# Patient Record
Sex: MALE | Race: WHITE | HISPANIC OR LATINO | Employment: FULL TIME | URBAN - METROPOLITAN AREA
[De-identification: names, ages, dates, MRNs, and addresses within clinical notes are randomized per-mention and may not be internally consistent; named-entity substitution may affect disease eponyms.]

---

## 2017-03-19 ENCOUNTER — HOSPITAL ENCOUNTER (OUTPATIENT)
Facility: MEDICAL CENTER | Age: 37
End: 2017-03-19
Attending: NURSE PRACTITIONER
Payer: COMMERCIAL

## 2017-03-19 ENCOUNTER — OFFICE VISIT (OUTPATIENT)
Dept: URGENT CARE | Facility: CLINIC | Age: 37
End: 2017-03-19
Payer: COMMERCIAL

## 2017-03-19 VITALS
SYSTOLIC BLOOD PRESSURE: 128 MMHG | HEIGHT: 70 IN | TEMPERATURE: 98.5 F | DIASTOLIC BLOOD PRESSURE: 74 MMHG | BODY MASS INDEX: 21.47 KG/M2 | OXYGEN SATURATION: 97 % | WEIGHT: 150 LBS | HEART RATE: 82 BPM

## 2017-03-19 DIAGNOSIS — Z20.2 POSSIBLE EXPOSURE TO STD: ICD-10-CM

## 2017-03-19 LAB
APPEARANCE UR: CLEAR
BILIRUB UR STRIP-MCNC: NORMAL MG/DL
COLOR UR AUTO: NORMAL
GLUCOSE UR STRIP.AUTO-MCNC: NORMAL MG/DL
KETONES UR STRIP.AUTO-MCNC: NORMAL MG/DL
LEUKOCYTE ESTERASE UR QL STRIP.AUTO: NORMAL
NITRITE UR QL STRIP.AUTO: NORMAL
PH UR STRIP.AUTO: 7 [PH] (ref 5–8)
PROT UR QL STRIP: NORMAL MG/DL
RBC UR QL AUTO: NORMAL
SP GR UR STRIP.AUTO: 1
UROBILINOGEN UR STRIP-MCNC: 0.2 MG/DL

## 2017-03-19 PROCEDURE — 87591 N.GONORRHOEAE DNA AMP PROB: CPT

## 2017-03-19 PROCEDURE — 87491 CHLMYD TRACH DNA AMP PROBE: CPT

## 2017-03-19 PROCEDURE — 81002 URINALYSIS NONAUTO W/O SCOPE: CPT | Performed by: NURSE PRACTITIONER

## 2017-03-19 PROCEDURE — 99204 OFFICE O/P NEW MOD 45 MIN: CPT | Performed by: NURSE PRACTITIONER

## 2017-03-19 RX ORDER — AZITHROMYCIN 500 MG/1
1000 TABLET, FILM COATED ORAL ONCE
Qty: 2 TAB | Refills: 0 | Status: SHIPPED | OUTPATIENT
Start: 2017-03-19 | End: 2017-03-19

## 2017-03-19 RX ORDER — CEFTRIAXONE SODIUM 250 MG/1
250 INJECTION, POWDER, FOR SOLUTION INTRAMUSCULAR; INTRAVENOUS ONCE
Status: COMPLETED | OUTPATIENT
Start: 2017-03-19 | End: 2017-03-19

## 2017-03-19 RX ADMIN — CEFTRIAXONE SODIUM 250 MG: 250 INJECTION, POWDER, FOR SOLUTION INTRAMUSCULAR; INTRAVENOUS at 17:37

## 2017-03-19 NOTE — MR AVS SNAPSHOT
"        Octavio Burleson   3/19/2017 4:15 PM   Office Visit   MRN: [id number]    Department:  [facility]   Dept Phone:  [phone]    Description:  Male : 1980   Provider:  BEE Valentin           Reason for Visit     Other possible std  , exposed 3--17 .      Allergies as of 3/19/2017     No Known Allergies      You were diagnosed with     Possible exposure to STD   [587489]         Vital Signs     Blood Pressure Pulse Temperature Height Weight Body Mass Index    128/74 mmHg 82 36.9 °C (98.5 °F) 1.778 m (5' 10\") 68.04 kg (150 lb) 21.52 kg/m2    Oxygen Saturation Smoking Status                97% Current Some Day Smoker          Basic Information     Date Of Birth Sex Race Ethnicity Preferred Language    1980 Male Unable to Obtain Unknown English      Health Maintenance        Date Due Completion Dates    IMM DTaP/Tdap/Td Vaccine (2 - Td) 4/15/2026 4/15/2016            Results     POCT Urinalysis      Component Value Standard Range & Units    POC Color straw Negative    POC Appearance clear Negative    POC Leukocyte Esterase neg Negative    POC Nitrites neg Negative    POC Urobiligen 0.2 Negative (0.2) mg/dL    POC Protein sm Negative mg/dL    POC Urine PH 7.0 5.0 - 8.0    POC Blood tr Negative    POC Specific Gravity 1.005 <1.005 - >1.030    POC Ketones neg Negative mg/dL    POC Biliruben neg Negative mg/dL    POC Glucose neg Negative mg/dL                        Current Immunizations     Influenza Vaccine Quad Inj (Pf) 10/24/2016  8:15 AM    Tdap Vaccine 4/15/2016      Below and/or attached are the medications your provider expects you to take. Review all of your home medications and newly ordered medications with your provider and/or pharmacist. Follow medication instructions as directed by your provider and/or pharmacist. Please keep your medication list with you and share with your provider. Update the information when medications are discontinued, doses are changed, or new " medications (including over-the-counter products) are added; and carry medication information at all times in the event of emergency situations     Allergies:  No Known Allergies          Medications  Valid as of: March 19, 2017 -  5:42 PM    Generic Name Brand Name Tablet Size Instructions for use    Azithromycin (Tab) ZITHROMAX 500 MG Take 2 Tabs by mouth Once for 1 dose.        Naproxen (Tab) NAPROSYN 500 MG Take 1 Tab by mouth 2 times a day, with meals.        Sildenafil Citrate (Tab) VIAGRA 50 MG Take 1 Tab by mouth as needed for Erectile Dysfunction.        .                 Medicines prescribed today were sent to:     [facility]  [place] [address]    [address]) [place] [address]    Phone: [phone] Fax: [phone]    Open 24 Hours?: No      Medication refill instructions:       If your prescription bottle indicates you have medication refills left, it is not necessary to call your provider’s office. Please contact your pharmacy and they will refill your medication.    If your prescription bottle indicates you do not have any refills left, you may request refills at any time through one of the following ways: The online [employer] system (except Urgent Care), by calling your provider’s office, or by asking your pharmacy to contact your provider’s office with a refill request. Medication refills are processed only during regular business hours and may not be available until the next business day. Your provider may request additional information or to have a follow-up visit with you prior to refilling your medication.   *Please Note: Medication refills are assigned a new Rx number when refilled electronically. Your pharmacy may indicate that no refills were authorized even though a new prescription for the same medication is available at the pharmacy. Please request the medicine by name with the pharmacy before contacting your provider for a refill.        Your To Do List      Future Labs/Procedures Complete By Expires    CHLAMYDIA/GC PCR URINE OR SWAB  As directed 3/19/2018         Turpitude Access Code: [email or web address]  Expires: 4/18/2017  4:00 PM    Turpitude  A secure, online tool to manage your health information     [employer] Turpitude® is a secure, online tool that connects you to your personalized health information from the privacy of your home -- day or night - making it very easy for you to manage your healthcare. Once the activation process is completed, you can even access your medical information using the Turpitude shobha, which is available for free in the Apple Shobha store or Google Play store.     Turpitude provides the following levels of access (as shown below):   My Chart Features   [facility] Doctor [facility]  Specialists [facility]  Urgent  Care Non-[facility]  Primary Care  Doctor   Email your healthcare team securely and privately 24/7 X X X    Manage appointments: schedule your next appointment; view details of past/upcoming appointments X      Request prescription refills. X      View recent personal medical records, including lab and immunizations X X X X   View health record, including health history, allergies, medications X X X X   Read reports about your outpatient visits, procedures, consult and ER notes X X X X   See your discharge summary, which is a recap of your hospital and/or ER visit that includes your diagnosis, lab results, and care plan. X X       How to register for Turpitude:  1. Go to  [email or web address].  2. Click on the Sign Up Now box, which takes you to the New Member Sign Up page. You will need to provide the following information:  a. Enter your Turpitude Access Code exactly as it appears at the top of this page. (You will not need to use this code after you’ve completed the sign-up process. If you do not sign up before the expiration date, you must request a new code.)   b. Enter your date of birth.   c. Enter your home email address.    d. Click Submit, and follow the next screen’s instructions.  3. Create a R[employer] ID. This will be your R[employer] Networks login ID and cannot be changed, so think of one that is secure and easy to remember.  4. Create a [employer] password. You can change your password at any time.  5. Enter your Password Reset Question and Answer. This can be used at a later time if you forget your password.   6. Enter your e-mail address. This allows you to receive e-mail notifications when new information is available in R[employer].  7. Click Sign Up. You can now view your health information.    For assistance activating your [employer] account, call [phone]        Quit Tobacco Information     Do you want to quit using tobacco?    Quitting tobacco decreases risks of cancer, heart and lung disease, increases life expectancy, improves sense of taste and smell, and increases spending money, among other benefits.    If you are thinking about quitting, we can help.  • [facility] Quit Tobacco Program: [phone]  o Program occurs weekly for four weeks and includes pharmacist consultation on products to support quitting smoking or chewing tobacco. A provider referral is needed for pharmacist consultation.  • Tobacco Users Help Hotline: 3-789-QUITNOW (780-9543) or https://[place].quitlogix.org/  o Free, confidential telephone and online coaching for [place] residents. Sessions are designed on a schedule that is convenient for you. Eligible clients receive free nicotine replacement therapy.  • Nationally: www.smokefree.gov  o Information and professional assistance to support both immediate and long-term needs as you become, and remain, a non-smoker. Smokefree.gov allows you to choose the help that best fits your needs.

## 2017-03-20 DIAGNOSIS — Z20.2 POSSIBLE EXPOSURE TO STD: ICD-10-CM

## 2017-03-20 LAB
C TRACH DNA GENITAL QL NAA+PROBE: NEGATIVE
N GONORRHOEA DNA GENITAL QL NAA+PROBE: NEGATIVE
SPECIMEN SOURCE: NORMAL

## 2017-03-20 ASSESSMENT — ENCOUNTER SYMPTOMS
WEAKNESS: 0
CHILLS: 0
FEVER: 0
BACK PAIN: 0
ABDOMINAL PAIN: 0
FLANK PAIN: 0
MYALGIAS: 0
VOMITING: 0
NAUSEA: 0

## 2017-03-20 NOTE — PROGRESS NOTES
"Subjective:      Octavio Burleson is a 36 y.o. male who presents with Other            Other  Pertinent negatives include no abdominal pain, chills, fever, myalgias, nausea, vomiting or weakness.   Octavio is a 36 year old male who is here for possible STD exposure. States was contacted last night by a woman he had unprotected sexual intercourse on 3/2/17. She stated she was being treated for \"pain\" (unsure of what he states) due to gonorrhea/chlamydia. Denies any dysuria, penis discharge, lesions, blisters, scrotal swelling/redness/pain. Denies fever, n/v, abdominal/back pain.    PMH:  has no past medical history on file.  MEDS:   Current outpatient prescriptions:   •  sildenafil citrate (VIAGRA) 50 MG tablet, Take 1 Tab by mouth as needed for Erectile Dysfunction., Disp: 10 Tab, Rfl: 3  •  naproxen (NAPROSYN) 500 MG Tab, Take 1 Tab by mouth 2 times a day, with meals. (Patient not taking: Reported on 4/15/2016), Disp: 60 Tab, Rfl: 0  ALLERGIES: No Known Allergies  SURGHX:   Past Surgical History   Procedure Laterality Date   • Humerus orif       SOCHX:  reports that he has been smoking Cigarettes and Cigars.  He has never used smokeless tobacco. He reports that he drinks alcohol. He reports that he does not use illicit drugs.  FH: Family history was reviewed, no pertinent findings to report      Review of Systems   Constitutional: Negative for fever, chills and malaise/fatigue.   Gastrointestinal: Negative for nausea, vomiting and abdominal pain.   Genitourinary: Negative for dysuria, urgency, frequency, hematuria and flank pain.   Musculoskeletal: Negative for myalgias and back pain.   Neurological: Negative for weakness.          Objective:     /74 mmHg  Pulse 82  Temp(Src) 36.9 °C (98.5 °F)  Ht 1.778 m (5' 10\")  Wt 68.04 kg (150 lb)  BMI 21.52 kg/m2  SpO2 97%     Physical Exam   Constitutional: He is oriented to person, place, and time. He appears well-developed and well-nourished. No distress.   HENT: "   Head: Normocephalic.   Eyes: Conjunctivae and EOM are normal. Pupils are equal, round, and reactive to light.   Neck: Normal range of motion. Neck supple.   Cardiovascular: Normal rate.    Pulmonary/Chest: Effort normal.   Genitourinary:    exam deferred due to no symptoms   Musculoskeletal: Normal range of motion.   Neurological: He is alert and oriented to person, place, and time.   Skin: Skin is warm and dry. He is not diaphoretic.   Vitals reviewed.    POC Color straw Negative Final      POC Appearance clear Negative Final     POC Leukocyte Esterase neg Negative Final     POC Nitrites neg Negative Final     POC Urobiligen 0.2 Negative (0.2) mg/dL Final     POC Protein sm Negative mg/dL Final     POC Urine PH 7.0 5.0 - 8.0 Final     POC Blood tr Negative Final     POC Specific Gravity 1.005 <1.005 - >1.030 Final     POC Ketones neg Negative mg/dL Final     POC Biliruben neg Negative mg/dL Final     POC Glucose neg Negative mg/dL Final               Assessment/Plan:     1. Possible exposure to STD    - POCT Urinalysis  - CHLAMYDIA/GC PCR URINE OR SWAB; Future  - cefTRIAXone (ROCEPHIN) injection 250 mg; 250 mg by Intramuscular route Once.  - azithromycin (ZITHROMAX) 500 MG tablet; Take 2 Tabs by mouth Once for 1 dose.  Dispense: 2 Tab; Refill: 0    Avoid unprotected sexual intercourse   Monitor for penial discharge, pain with urination, blood or lower abdominal or back pain-  Need re-evaluation

## 2017-03-22 ENCOUNTER — TELEPHONE (OUTPATIENT)
Dept: URGENT CARE | Facility: CLINIC | Age: 37
End: 2017-03-22

## 2017-03-23 ENCOUNTER — OFFICE VISIT (OUTPATIENT)
Dept: URGENT CARE | Facility: CLINIC | Age: 37
End: 2017-03-23
Payer: COMMERCIAL

## 2017-03-23 VITALS
BODY MASS INDEX: 21.47 KG/M2 | RESPIRATION RATE: 16 BRPM | WEIGHT: 150 LBS | HEIGHT: 70 IN | OXYGEN SATURATION: 97 % | TEMPERATURE: 98.2 F | DIASTOLIC BLOOD PRESSURE: 80 MMHG | SYSTOLIC BLOOD PRESSURE: 120 MMHG | HEART RATE: 82 BPM

## 2017-03-23 DIAGNOSIS — L60.0 INGROWN NAIL OF GREAT TOE OF LEFT FOOT: ICD-10-CM

## 2017-03-23 PROCEDURE — 99214 OFFICE O/P EST MOD 30 MIN: CPT | Performed by: NURSE PRACTITIONER

## 2017-03-23 NOTE — MR AVS SNAPSHOT
"        Octavio Hopkinsura   3/23/2017 5:45 PM   Office Visit   MRN: [id number]    Department:  [facility]   Dept Phone:  [phone]    Description:  Male : 1980   Provider:  JACINTA Osorio           Reason for Visit     Toe Pain           Allergies as of 3/23/2017     No Known Allergies      You were diagnosed with     Ingrown nail of great toe of left foot   [2549770]         Vital Signs     Blood Pressure Pulse Temperature Respirations Height Weight    120/80 mmHg 82 36.8 °C (98.2 °F) 16 1.778 m (5' 10\") 68.04 kg (150 lb)    Body Mass Index Oxygen Saturation Smoking Status             21.52 kg/m2 97% Current Some Day Smoker         Basic Information     Date Of Birth Sex Race Ethnicity Preferred Language    1980 Male Unable to Obtain Unknown English      Health Maintenance        Date Due Completion Dates    IMM DTaP/Tdap/Td Vaccine (2 - Td) 4/15/2026 4/15/2016            Current Immunizations     Influenza Vaccine Quad Inj (Pf) 10/24/2016  8:15 AM    Tdap Vaccine 4/15/2016      Below and/or attached are the medications your provider expects you to take. Review all of your home medications and newly ordered medications with your provider and/or pharmacist. Follow medication instructions as directed by your provider and/or pharmacist. Please keep your medication list with you and share with your provider. Update the information when medications are discontinued, doses are changed, or new medications (including over-the-counter products) are added; and carry medication information at all times in the event of emergency situations     Allergies:  No Known Allergies          Medications  Valid as of: 2017 -  6:40 PM    Generic Name Brand Name Tablet Size Instructions for use    Mupirocin (Ointment) BACTROBAN 2 % Apply to affected area TID for 5 days.        Naproxen (Tab) NAPROSYN 500 MG Take 1 Tab by mouth 2 times a day, with meals.        Sildenafil Citrate (Tab) VIAGRA 50 MG " Take 1 Tab by mouth as needed for Erectile Dysfunction.        .                 Medicines prescribed today were sent to:     [facility] [address] [facility] ([place]), [place] [address]    [address] [place]) [place] [address]    Phone: [phone] Fax: [phone]    Open 24 Hours?: No      Medication refill instructions:       If your prescription bottle indicates you have medication refills left, it is not necessary to call your provider’s office. Please contact your pharmacy and they will refill your medication.    If your prescription bottle indicates you do not have any refills left, you may request refills at any time through one of the following ways: The online [employer] system (except Urgent Care), by calling your provider’s office, or by asking your pharmacy to contact your provider’s office with a refill request. Medication refills are processed only during regular business hours and may not be available until the next business day. Your provider may request additional information or to have a follow-up visit with you prior to refilling your medication.   *Please Note: Medication refills are assigned a new Rx number when refilled electronically. Your pharmacy may indicate that no refills were authorized even though a new prescription for the same medication is available at the pharmacy. Please request the medicine by name with the pharmacy before contacting your provider for a refill.        Referral     A referral request has been sent to our patient care coordination department. Please allow 3-5 business days for us to process this request and contact you either by phone or mail. If you do not hear from us by the 5th business day, please call us at [phone].           [employer] Access Code: [email or web address]  Expires: 4/18/2017  4:00 PM    [employer]  A secure, online tool to manage your health information     [employer]’s [employer]® is a secure, online tool that connects you to your personalized health  information from the privacy of your home -- day or night - making it very easy for you to manage your healthcare. Once the activation process is completed, you can even access your medical information using the [employer] shobha, which is available for free in the Apple Shobha store or Google Play store.     [employer] provides the following levels of access (as shown below):   My Chart Features   [facility] Doctor [facility]  Specialists [facility]  Urgent  Care Non-[facility]  Primary Care  Doctor   Email your healthcare team securely and privately 24/7 X X X    Manage appointments: schedule your next appointment; view details of past/upcoming appointments X      Request prescription refills. X      View recent personal medical records, including lab and immunizations X X X X   View health record, including health history, allergies, medications X X X X   Read reports about your outpatient visits, procedures, consult and ER notes X X X X   See your discharge summary, which is a recap of your hospital and/or ER visit that includes your diagnosis, lab results, and care plan. X X       How to register for [employer] Systems:  1. Go to  [email or web address].  2. Click on the Sign Up Now box, which takes you to the New Member Sign Up page. You will need to provide the following information:  a. Enter your [employer] Systems Access Code exactly as it appears at the top of this page. (You will not need to use this code after you’ve completed the sign-up process. If you do not sign up before the expiration date, you must request a new code.)   b. Enter your date of birth.   c. Enter your home email address.   d. Click Submit, and follow the next screen’s instructions.  3. Create a T[employer] Systems ID. This will be your [employer] login ID and cannot be changed, so think of one that is secure and easy to remember.  4. Create a [employer] password. You can change your password at any time.  5. Enter your Password Reset Question and Answer. This can be used at a later  time if you forget your password.   6. Enter your e-mail address. This allows you to receive e-mail notifications when new information is available in [employer].  7. Click Sign Up. You can now view your health information.    For assistance activating your [employer] account, call [phone]        Quit Tobacco Information     Do you want to quit using tobacco?    Quitting tobacco decreases risks of cancer, heart and lung disease, increases life expectancy, improves sense of taste and smell, and increases spending money, among other benefits.    If you are thinking about quitting, we can help.  • [facility] Quit Tobacco Program: 156.695.5406  o Program occurs weekly for four weeks and includes pharmacist consultation on products to support quitting smoking or chewing tobacco. A provider referral is needed for pharmacist consultation.  • Tobacco Users Help Hotline: 0-524-QUIT-NOW (173-3538) or https://[place].quitlogix.org/  o Free, confidential telephone and online coaching for [place] residents. Sessions are designed on a schedule that is convenient for you. Eligible clients receive free nicotine replacement therapy.  • Nationally: www.smokefree.gov  o Information and professional assistance to support both immediate and long-term needs as you become, and remain, a non-smoker. Smokefree.gov allows you to choose the help that best fits your needs.

## 2017-03-25 ASSESSMENT — ENCOUNTER SYMPTOMS
FEVER: 0
DIAPHORESIS: 0
WEAKNESS: 0
CHILLS: 0

## 2017-03-25 NOTE — PROGRESS NOTES
"Subjective:      Octavio Burleson is a 36 y.o. male who presents with Toe Pain            HPI  Patient reports that a year ago he consulted Dr. De Leon (podiatry) for chronic ingrown toenails.  He had ingrown nail removal and chemical destruction of the nail.  He reports that he remained asymptomatic until 2 weeks ago.  He has noted some progressive thickening along the nail margins of the great toes and tenderness to palpation.  No fever or purulence.  He has not tried any home measures to treat the symptoms.      Review of Systems   Constitutional: Negative for fever, chills, malaise/fatigue and diaphoresis.   Musculoskeletal: Negative for joint pain.   Neurological: Negative for weakness.     Medications, Allergies, and current problem list reviewed today in Epic     Objective:     /80 mmHg  Pulse 82  Temp(Src) 36.8 °C (98.2 °F)  Resp 16  Ht 1.778 m (5' 10\")  Wt 68.04 kg (150 lb)  BMI 21.52 kg/m2  SpO2 97%     Physical Exam   Constitutional: He is oriented to person, place, and time. He appears well-developed and well-nourished. No distress.   Cardiovascular: Normal rate, regular rhythm and normal heart sounds.    Pulmonary/Chest: Effort normal and breath sounds normal.   Musculoskeletal:        Left foot: There is tenderness.        Feet:    Cuticle area thickening with tenderness to palpation along the proximal, lateral aspect of the nail margin of each great toe.  Thickening is rough and horn-like with no fluctuance or erythema.  No purulence.  Pedal pulses intact.  Cap refill < 2 seconds.  NO gait abnormalities.       Neurological: He is alert and oriented to person, place, and time.   Skin: He is not diaphoretic.   Vitals reviewed.              Assessment/Plan:     1. Ingrown nail of great toe of left foot  - REFERRAL TO PODIATRY  - mupirocin (BACTROBAN) 2 % Ointment; Apply to affected area TID for 5 days.  Dispense: 1 Tube; Refill: 0    Reviewed differential of recurring ingrown nail versus other " podiatric callous formation.    Trial warm water soaks with apple cider vinegar for 15 minutes TID.  Apply Bactroban after soaks as prescribed.  Follow up with podiatry as referred.  Patient verbalized understanding of and agreed with plan of care.

## 2017-07-26 ENCOUNTER — TELEPHONE (OUTPATIENT)
Dept: MEDICAL GROUP | Facility: MEDICAL CENTER | Age: 37
End: 2017-07-26

## 2017-07-26 NOTE — TELEPHONE ENCOUNTER
Future Appointments       Provider Department [place]    7/28/2017 8:20 AM Wade Ortiz M.D. [facility] [address]    8/11/2017 8:00 AM KIMMY Nagy [facility] [address]        ESTABLISHED PATIENT PRE-VISIT PLANNING     Note: Patient will not be contacted if there is no indication to call.     1.  Reviewed note from last office visit with PCP and/or other med group provider: Yes    2.  If any orders were placed at last visit, do we have Results/Consult Notes?        •  Labs - Labs were not ordered at last office visit.       •  Imaging - Imaging was not ordered at last office visit.       •  Referrals - No referrals were ordered at last office visit.    3.  Immunizations were updated in [facility] using WebIZ?: Yes       •  Web Iz Recommendations: HEPATITIS A  and VARICELLA (Chicken Pox)     4.  Patient is due for the following Health Maintenance Topics:   There are no preventive care reminders to display for this patient.    - Patient is up-to-date on all Health Maintenance topics. No records have been requested at this time.    5.  Patient was not informed to arrive 15 min prior to their scheduled appointment and bring in their medication bottles.

## 2017-07-28 ENCOUNTER — OFFICE VISIT (OUTPATIENT)
Dept: MEDICAL GROUP | Facility: MEDICAL CENTER | Age: 37
End: 2017-07-28
Payer: COMMERCIAL

## 2017-07-28 ENCOUNTER — HOSPITAL ENCOUNTER (OUTPATIENT)
Dept: LAB | Facility: MEDICAL CENTER | Age: 37
End: 2017-07-28
Attending: NURSE PRACTITIONER
Payer: COMMERCIAL

## 2017-07-28 VITALS
DIASTOLIC BLOOD PRESSURE: 64 MMHG | TEMPERATURE: 97.5 F | BODY MASS INDEX: 22.39 KG/M2 | HEART RATE: 70 BPM | WEIGHT: 156.4 LBS | OXYGEN SATURATION: 95 % | HEIGHT: 70 IN | SYSTOLIC BLOOD PRESSURE: 94 MMHG | RESPIRATION RATE: 16 BRPM

## 2017-07-28 DIAGNOSIS — N52.9 ERECTILE DYSFUNCTION, UNSPECIFIED ERECTILE DYSFUNCTION TYPE: ICD-10-CM

## 2017-07-28 DIAGNOSIS — Z30.09 VASECTOMY EVALUATION: ICD-10-CM

## 2017-07-28 LAB
ALBUMIN SERPL BCP-MCNC: 4.4 G/DL (ref 3.2–4.9)
ALBUMIN/GLOB SERPL: 1.5 G/DL
ALP SERPL-CCNC: 42 U/L (ref 30–99)
ALT SERPL-CCNC: 15 U/L (ref 2–50)
ANION GAP SERPL CALC-SCNC: 9 MMOL/L (ref 0–11.9)
AST SERPL-CCNC: 16 U/L (ref 12–45)
BILIRUB SERPL-MCNC: 3.1 MG/DL (ref 0.1–1.5)
BUN SERPL-MCNC: 13 MG/DL (ref 8–22)
CALCIUM SERPL-MCNC: 9.7 MG/DL (ref 8.5–10.5)
CHLORIDE SERPL-SCNC: 108 MMOL/L (ref 96–112)
CHOLEST SERPL-MCNC: 169 MG/DL (ref 100–199)
CO2 SERPL-SCNC: 25 MMOL/L (ref 20–33)
CREAT SERPL-MCNC: 0.8 MG/DL (ref 0.5–1.4)
GFR SERPL CREATININE-BSD FRML MDRD: >60 ML/MIN/1.73 M 2
GLOBULIN SER CALC-MCNC: 3 G/DL (ref 1.9–3.5)
GLUCOSE SERPL-MCNC: 84 MG/DL (ref 65–99)
HDLC SERPL-MCNC: 61 MG/DL
LDLC SERPL CALC-MCNC: 93 MG/DL
POTASSIUM SERPL-SCNC: 4.3 MMOL/L (ref 3.6–5.5)
PROT SERPL-MCNC: 7.4 G/DL (ref 6–8.2)
SODIUM SERPL-SCNC: 142 MMOL/L (ref 135–145)
TRIGL SERPL-MCNC: 76 MG/DL (ref 0–149)
TSH SERPL DL<=0.005 MIU/L-ACNC: 2.59 UIU/ML (ref 0.3–3.7)

## 2017-07-28 PROCEDURE — 80061 LIPID PANEL: CPT

## 2017-07-28 PROCEDURE — 80053 COMPREHEN METABOLIC PANEL: CPT

## 2017-07-28 PROCEDURE — 84403 ASSAY OF TOTAL TESTOSTERONE: CPT

## 2017-07-28 PROCEDURE — 84402 ASSAY OF FREE TESTOSTERONE: CPT

## 2017-07-28 PROCEDURE — 99213 OFFICE O/P EST LOW 20 MIN: CPT | Performed by: INTERNAL MEDICINE

## 2017-07-28 PROCEDURE — 84443 ASSAY THYROID STIM HORMONE: CPT

## 2017-07-28 PROCEDURE — 36415 COLL VENOUS BLD VENIPUNCTURE: CPT

## 2017-07-28 PROCEDURE — 84270 ASSAY OF SEX HORMONE GLOBUL: CPT

## 2017-07-28 ASSESSMENT — PATIENT HEALTH QUESTIONNAIRE - PHQ9: CLINICAL INTERPRETATION OF PHQ2 SCORE: 0

## 2017-07-28 NOTE — Clinical Note
July 28, 2017     Octavio Burleson  [address] [place] [address]      Dear Octavio:    Thank you for enrolling in [facility]. Please follow the instructions below to securely access your online medical record. Roller[facility] allows you to send messages to your healthcare team, view certain test results, renew your prescriptions, schedule appointments, and more.     How Do I Sign Up?  1. In your Internet browser, go to  [email or web address]  2. Click on the Sign Up Now link in the Sign In box. You will see the New Member Sign Up page.  3. Enter your Roller[facility] Access Code exactly as it appears below (case sensitive). You will not need to use this code after you’ve completed the sign-up process. If you do not sign up before the expiration date, you must request a new code.  Roller[facility] Access Code: [email or web address]  Expires: 8/20/2017  3:59 PM    4. Enter your Email address and Date of Birth (mm/dd/yyyy) as indicated and click Submit. You will be taken to the next sign-up page.  5. Create a [facility]ler[facility] ID (case sensitive) . This will be your [facility]ler[facility] login ID and cannot be changed, so think of one that is secure and easy to remember.  6. Create a Roller[facility] password  (case sensitive).   · Your password must be a length of at least 6 characters/digits.  · It must include at least 1 numeric.  · You can change your password at any time.  7. Enter your Password Reset Question and Answer. This can be used at a later time if you forget your password.   8. Enter your e-mail address. You will receive e-mail notification when new information is available in [facility]ler[facility].  9. Click Sign Up. You can now view your medical record.     Additional Information  Please contact [facility]ler[facility] Customer Support at [phone] for any questions . Remember, Roller[facility] is NOT to be used for urgent needs. For medical emergencies, dial 911.          Introducing Roller[facility]    [facility]’s Secure, Online Health Connection      [facility] now offers convenient,  online access to your healthcare team and personal health information.  Vollee makes managing your healthcare easier than ever, and allows you to:  • Email your healthcare provider securely and privately  • Access your test results  • Request prescription refills 24 hours a day  • View your personal medical records from home  • Schedule or change your appointments  • View your Insurance and Billing Information  • Pay bills online ? Coming soon!        Sign below to get started:  I hereby request access to DabKick application.  I agree to abide by the [facility]lee Terms and Conditions, which will be provided to me upon activating my account.       __________________________________        _________________________  Name (Please Print)          Date of Birth     __________________________________       _________________________  Signature          Primary Care Provider      _______________  Date                          *For Internal Use Only: Please scan this form into the patient’s chart. Click on  - Select Patient - Attach to Encounter:  - Document Type: Consent   - Document Description: MyChart Consent

## 2017-07-28 NOTE — PROGRESS NOTES
"CC: Family planning.    HPI:   Octavio presents today with the following.    1. Vasectomy evaluation  Patient reports that he has one child currently  and is not interested in having future children. He is that a lot of research and would like to be referred for vasectomy. He denies any testicular problems or pain and has no other physical complaints today. No chest pain first retinal edema.      There are no active problems to display for this patient.  no chronic problems     Current Outpatient Prescriptions   Medication Sig Dispense Refill   • sildenafil citrate (VIAGRA) 50 MG tablet Take 1 Tab by mouth as needed for Erectile Dysfunction. (Patient not taking: Reported on 7/28/2017) 10 Tab 3   • naproxen (NAPROSYN) 500 MG Tab Take 1 Tab by mouth 2 times a day, with meals. (Patient not taking: Reported on 4/15/2016) 60 Tab 0     No current facility-administered medications for this visit.         Allergies as of 07/28/2017   • (No Known Allergies)        ROS: As per HPI.    BP 94/64 mmHg  Pulse 70  Temp(Src) 36.4 °C (97.5 °F)  Resp 16  Ht 1.778 m (5' 10\")  Wt 70.943 kg (156 lb 6.4 oz)  BMI 22.44 kg/m2  SpO2 95%    Physical Exam:  Gen:         Alert and oriented, No apparent distress.  Neck:        No Lymphadenopathy or Bruits.  Lungs:     Clear to auscultation bilaterally  CV:          Regular rate and rhythm. No murmurs, rubs or gallops.               Ext:          No clubbing, cyanosis, edema.      Assessment and Plan.   37 y.o. male with the following issues.    1. Vasectomy evaluation  Have placed referral to urology answer questions regarding the procedure and post care. He is also encouraged that the blood work done at his regular PCP ordered.  - REFERRAL TO UROLOGY        "

## 2017-07-28 NOTE — MR AVS SNAPSHOT
"        Octaviocarlos Burleson   2017 8:20 AM   Office Visit   MRN: [id number]    Department:  [address] [place] [address]   Dept Phone:  [phone]    Description:  Male : 1980   Provider:  Wade Ortiz M.D.           Reason for Visit     Referral Needed referral for vasectomy    Orders Needed lab work order for testosterone      Allergies as of 2017     No Known Allergies      You were diagnosed with     Vasectomy evaluation   [292120]         Vital Signs     Blood Pressure Pulse Temperature Respirations Height Weight    94/64 mmHg 70 36.4 °C (97.5 °F) 16 1.778 m (5' 10\") 70.943 kg (156 lb 6.4 oz)    Body Mass Index Oxygen Saturation Smoking Status             22.44 kg/m2 95% Current Some Day Smoker         Basic Information     Date Of Birth Sex Race Ethnicity Preferred Language    1980 Male Unable to Obtain Unknown English      Your appointments     2017  8:45 AM   Adult Draw/Collection with [facility]   [facility] (--)    [address]  [place] [address]   [phone]            Aug 11, 2017  8:00 AM   Established Patient with KIMMY Nagy   [facility] [address]    [address]  [address]  [place] [id number] [phone]           You will be receiving a confirmation call a few days before your appointment from our automated call confirmation system.              Health Maintenance        Date Due Completion Dates    IMM INFLUENZA (1) 2017 10/24/2016    IMM DTaP/Tdap/Td Vaccine (2 - Td) 4/15/2026 4/15/2016            Current Immunizations     Influenza Vaccine Quad Inj (Pf) 10/24/2016  8:15 AM    Tdap Vaccine 4/15/2016      Below and/or attached are the medications your provider expects you to take. Review all of your home medications and newly ordered medications with your provider and/or pharmacist. Follow medication instructions as directed by your provider and/or pharmacist. Please keep your medication list with you and share with your provider. " Update the information when medications are discontinued, doses are changed, or new medications (including over-the-counter products) are added; and carry medication information at all times in the event of emergency situations     Allergies:  No Known Allergies          Medications  Valid as of: July 28, 2017 -  8:41 AM    Generic Name Brand Name Tablet Size Instructions for use    Naproxen (Tab) NAPROSYN 500 MG Take 1 Tab by mouth 2 times a day, with meals.        Sildenafil Citrate (Tab) VIAGRA 50 MG Take 1 Tab by mouth as needed for Erectile Dysfunction.        .                 Medicines prescribed today were sent to:     [facility] [address] [facility] ([place]), [place] - [address]    [address] [facility] [place] [address]    Phone: [phone] Fax: [phone]    Open 24 Hours?: No      Medication refill instructions:       If your prescription bottle indicates you have medication refills left, it is not necessary to call your provider’s office. Please contact your pharmacy and they will refill your medication.    If your prescription bottle indicates you do not have any refills left, you may request refills at any time through one of the following ways: The online [employer]id[employer] system (except Urgent Care), by calling your provider’s office, or by asking your pharmacy to contact your provider’s office with a refill request. Medication refills are processed only during regular business hours and may not be available until the next business day. Your provider may request additional information or to have a follow-up visit with you prior to refilling your medication.   *Please Note: Medication refills are assigned a new Rx number when refilled electronically. Your pharmacy may indicate that no refills were authorized even though a new prescription for the same medication is available at the pharmacy. Please request the medicine by name with the pharmacy before contacting your provider for a refill.        Referral     A  referral request has been sent to our patient care coordination department. Please allow 3-5 business days for us to process this request and contact you either by phone or mail. If you do not hear from us by the 5th business day, please call us at [phone].           Formlabs Access Code: [email or web address]  Expires: 8/20/2017  3:59 PM    Formlabs  A secure, online tool to manage your health information     [employer]’s Formlabs® is a secure, online tool that connects you to your personalized health information from the privacy of your home -- day or night - making it very easy for you to manage your healthcare. Once the activation process is completed, you can even access your medical information using the Formlabs shobha, which is available for free in the Apple Shobha store or Google Play store.     Formlabs provides the following levels of access (as shown below):   My Chart Features   [facility] Primary Care Doctor [facility]  Specialists [facility]  Urgent  Care [facility]  Primary Care  Doctor   Email your healthcare team securely and privately 24/7 X X X    Manage appointments: schedule your next appointment; view details of past/upcoming appointments X      Request prescription refills. X      View recent personal medical records, including lab and immunizations X X X X   View health record, including health history, allergies, medications X X X X   Read reports about your outpatient visits, procedures, consult and ER notes X X X X   See your discharge summary, which is a recap of your hospital and/or ER visit that includes your diagnosis, lab results, and care plan. X X       How to register for Formlabs:  1. Go to  [email or web address].  2. Click on the Sign Up Now box, which takes you to the New Member Sign Up page. You will need to provide the following information:  a. Enter your Formlabs Access Code exactly as it appears at the top of this page. (You will not need to use this code after you’ve completed the  sign-up process. If you do not sign up before the expiration date, you must request a new code.)   b. Enter your date of birth.   c. Enter your home email address.   d. Click Submit, and follow the next screen’s instructions.  3. Create a [employer] ID. This will be your [employer] login ID and cannot be changed, so think of one that is secure and easy to remember.  4. Create a BlueMessagingt password. You can change your password at any time.  5. Enter your Password Reset Question and Answer. This can be used at a later time if you forget your password.   6. Enter your e-mail address. This allows you to receive e-mail notifications when new information is available in [employer].  7. Click Sign Up. You can now view your health information.    For assistance activating your [employer] account, call [phone]        Quit Tobacco Information     Do you want to quit using tobacco?    Quitting tobacco decreases risks of cancer, heart and lung disease, increases life expectancy, improves sense of taste and smell, and increases spending money, among other benefits.    If you are thinking about quitting, we can help.  • [facility]: 327.729.9858  o Program occurs weekly for four weeks and includes pharmacist consultation on products to support quitting smoking or chewing tobacco. A provider referral is needed for pharmacist consultation.  • Tobacco Users Help Hotline: 4-800QUIT-NOW (547-5601) or https://[place].quitlogix.org/  o Free, confidential telephone and online coaching for [place] residents. Sessions are designed on a schedule that is convenient for you. Eligible clients receive free nicotine replacement therapy.  • Nationally: www.smokefree.gov  o Information and professional assistance to support both immediate and long-term needs as you become, and remain, a non-smoker. Smokefree.gov allows you to choose the help that best fits your needs.

## 2017-07-29 LAB
SHBG SERPL-SCNC: 54 NMOL/L (ref 11–80)
TESTOST FREE MFR SERPL: 1.5 % (ref 1.6–2.9)
TESTOST FREE SERPL-MCNC: 111 PG/ML (ref 47–244)
TESTOST SERPL-MCNC: 742 NG/DL (ref 300–1080)

## 2017-08-02 ENCOUNTER — TELEPHONE (OUTPATIENT)
Dept: MEDICAL GROUP | Facility: MEDICAL CENTER | Age: 37
End: 2017-08-02

## 2017-08-02 NOTE — TELEPHONE ENCOUNTER
Future Appointments       Provider Department Center    8/11/2017 8:00 AM KIMMY Nagy [facility] [address]        ESTABLISHED PATIENT PRE-VISIT PLANNING     Note: Patient will not be contacted if there is no indication to call.     1.  Reviewed note from last office visit with PCP and/or other med group provider: Yes    2.  If any orders were placed at last visit, do we have Results/Consult Notes?        •  Labs - Labs ordered, completed and results are in chart.       •  Imaging - Imaging was not ordered at last office visit.       •  Referrals - No referrals were ordered at last office visit.    3.  Immunizations were updated in Epic using WebIZ?: Yes       •  Web Iz Recommendations: Patient is up to date on all vaccines    4.  Patient is due for the following Health Maintenance Topics:   There are no preventive care reminders to display for this patient.    - Patient is up-to-date on all Health Maintenance topics. No records have been requested at this time.    5.  Patient was not informed to arrive 15 min prior to their scheduled appointment and bring in their medication bottles.

## 2017-08-11 ENCOUNTER — OFFICE VISIT (OUTPATIENT)
Dept: MEDICAL GROUP | Facility: MEDICAL CENTER | Age: 37
End: 2017-08-11
Payer: COMMERCIAL

## 2017-08-11 VITALS
OXYGEN SATURATION: 98 % | SYSTOLIC BLOOD PRESSURE: 110 MMHG | HEART RATE: 72 BPM | BODY MASS INDEX: 22.48 KG/M2 | TEMPERATURE: 97.1 F | WEIGHT: 157 LBS | DIASTOLIC BLOOD PRESSURE: 66 MMHG | RESPIRATION RATE: 16 BRPM | HEIGHT: 70 IN

## 2017-08-11 DIAGNOSIS — E80.6 HYPERBILIRUBINEMIA: ICD-10-CM

## 2017-08-11 PROCEDURE — 99213 OFFICE O/P EST LOW 20 MIN: CPT | Performed by: NURSE PRACTITIONER

## 2017-08-11 NOTE — MR AVS SNAPSHOT
"Octavio Burleson   2017 8:00 AM   Office Visit   MRN: [id number]    Department:  [address] [place] [address]   Dept Phone:  [phone]    Description:  Male : 1980   Provider:  KIMMY Nagy           Reason for Visit     Referral Needed urologist/ vasectomy    Results labs       Allergies as of 2017     No Known Allergies      You were diagnosed with     Hyperbilirubinemia   [820197]         Vital Signs     Blood Pressure Pulse Temperature Respirations Height Weight    110/66 mmHg 72 36.2 °C (97.1 °F) 16 1.778 m (5' 10\") 71.215 kg (157 lb)    Body Mass Index Oxygen Saturation Smoking Status             22.53 kg/m2 98% Current Some Day Smoker         Basic Information     Date Of Birth Sex Race Ethnicity Preferred Language    1980 Male White  Origin (Botswanan,Palauan,[place]an,[place]gil, etc) English      Problem List              ICD-10-CM Priority Class Noted - Resolved    Hyperbilirubinemia E80.6   2017 - Present      Health Maintenance        Date Due Completion Dates    IMM INFLUENZA (1) 2017 10/24/2016    IMM DTaP/Tdap/Td Vaccine (2 - Td) 4/15/2026 4/15/2016            Current Immunizations     Influenza Vaccine Quad Inj (Pf) 10/24/2016  8:15 AM    Tdap Vaccine 4/15/2016      Below and/or attached are the medications your provider expects you to take. Review all of your home medications and newly ordered medications with your provider and/or pharmacist. Follow medication instructions as directed by your provider and/or pharmacist. Please keep your medication list with you and share with your provider. Update the information when medications are discontinued, doses are changed, or new medications (including over-the-counter products) are added; and carry medication information at all times in the event of emergency situations     Allergies:  No Known Allergies          Medications  Valid as of: 2017 -  8:23 AM    Generic Name Brand Name Tablet Size " Instructions for use    Sildenafil Citrate (Tab) VIAGRA 50 MG Take 1 Tab by mouth as needed for Erectile Dysfunction.        .                 Medicines prescribed today were sent to:     [facility] ([place]), [place] - [address]    [address] [facility] [place] ([place] [place] [address]    Phone: [phone] Fax: [phone]    Open 24 Hours?: No      Medication refill instructions:       If your prescription bottle indicates you have medication refills left, it is not necessary to call your provider’s office. Please contact your pharmacy and they will refill your medication.    If your prescription bottle indicates you do not have any refills left, you may request refills at any time through one of the following ways: The online [facility] system (except Urgent Care), by calling your provider’s office, or by asking your pharmacy to contact your provider’s office with a refill request. Medication refills are processed only during regular business hours and may not be available until the next business day. Your provider may request additional information or to have a follow-up visit with you prior to refilling your medication.   *Please Note: Medication refills are assigned a new Rx number when refilled electronically. Your pharmacy may indicate that no refills were authorized even though a new prescription for the same medication is available at the pharmacy. Please request the medicine by name with the pharmacy before contacting your provider for a refill.        Your To Do List     Future Labs/Procedures Complete By Expires    BILIRUBIN DIRECT  As directed 8/11/2018    COMP METABOLIC PANEL  As directed 8/12/2018         [facility] Access Code: Activation code not generated  Current [facility] Status: Active          Quit Tobacco Information     Do you want to quit using tobacco?    Quitting tobacco decreases risks of cancer, heart and lung disease, increases life expectancy, improves sense of taste and smell, and increases  spending money, among other benefits.    If you are thinking about quitting, we can help.  • [facility]uit [facility]: 877.227.6052  o Program occurs weekly for four weeks and includes pharmacist consultation on products to support quitting smoking or chewing tobacco. A provider referral is needed for pharmacist consultation.  • Tobacco Users Help Hotline: 0-134-QUIT-NOW (847-6820) or https://[place].quitlogix.org/  o Free, confidential telephone and online coaching for [place] residents. Sessions are designed on a schedule that is convenient for you. Eligible clients receive free nicotine replacement therapy.  • Nationally: www.smokefree.gov  o Information and professional assistance to support both immediate and long-term needs as you become, and remain, a non-smoker. Smokefree.gov allows you to choose the help that best fits your needs.

## 2017-08-11 NOTE — PROGRESS NOTES
"Subjective:      Octavio Burleson is a 37 y.o. male who presents with Referral Needed and Results            HPI Octavio Burleson is here today for follow-up on lab work.    Patient did recently work because of issues with erectile dysfunction and results come back showing normal testosterone levels. He does have an appointment next week with urology for vasectomy. His lab work was completely normal except for elevated bilirubin level at 3.1. I have no other labs to compare it to but he states he did [facility] and was told that his bile levels were elevated. His lab work shows no other abnormalities with liver or gallbladder. He has no abdominal pain or complaints of nausea and vomiting. He states he otherwise feels well.        Social History   Substance Use Topics   • Smoking status: Current Some Day Smoker     Types: Cigarettes, Cigars   • Smokeless tobacco: Never Used   • Alcohol Use: Yes      Comment: binge     Current Outpatient Prescriptions   Medication Sig Dispense Refill   • sildenafil citrate (VIAGRA) 50 MG tablet Take 1 Tab by mouth as needed for Erectile Dysfunction. (Patient not taking: Reported on 7/28/2017) 10 Tab 3     No current facility-administered medications for this visit.   History reviewed. No pertinent past medical history.   Family History   Problem Relation Age of Onset   • Hypertension Mother    • Diabetes Mother    • Diabetes Father    • Hypertension Father        Review of Systems   All other systems reviewed and are negative.         Objective:     /66 mmHg  Pulse 72  Temp(Src) 36.2 °C (97.1 °F)  Resp 16  Ht 1.778 m (5' 10\")  Wt 71.215 kg (157 lb)  BMI 22.53 kg/m2  SpO2 98%     Physical Exam   Constitutional: He is oriented to person, place, and time. He appears well-developed and well-nourished. No distress.   HENT:   Head: Normocephalic and atraumatic.   Right Ear: External ear normal.   Left Ear: External ear normal.   Nose: Nose " normal.   Mouth/Throat: Oropharynx is clear and moist.   Eyes: Conjunctivae are normal. Right eye exhibits no discharge. Left eye exhibits no discharge.   Neck: Normal range of motion. Neck supple. No tracheal deviation present. No thyromegaly present.   Cardiovascular: Normal rate, regular rhythm and normal heart sounds.    No murmur heard.  Pulmonary/Chest: Effort normal and breath sounds normal. No respiratory distress. He has no wheezes. He has no rales.   Lymphadenopathy:     He has no cervical adenopathy.   Neurological: He is alert and oriented to person, place, and time. Coordination normal.   Skin: Skin is warm and dry. No rash noted. He is not diaphoretic. No erythema.   Psychiatric: He has a normal mood and affect. His behavior is normal. Judgment and thought content normal.   Nursing note and vitals reviewed.              Component      Latest Ref Rng 7/28/2017 7/28/2017 7/28/2017 7/28/2017           9:04 AM  9:04 AM  9:04 AM  9:04 AM   Sodium      135 - 145 mmol/L       Potassium      3.6 - 5.5 mmol/L       Chloride      96 - 112 mmol/L       Co2      20 - 33 mmol/L       Anion Gap      0.0 - 11.9       Glucose      65 - 99 mg/dL       Bun      8 - 22 mg/dL       Creatinine      0.50 - 1.40 mg/dL       Calcium      8.5 - 10.5 mg/dL       AST(SGOT)      12 - 45 U/L       ALT(SGPT)      2 - 50 U/L       Alkaline Phosphatase      30 - 99 U/L       Total Bilirubin      0.1 - 1.5 mg/dL       Albumin      3.2 - 4.9 g/dL       Total Protein      6.0 - 8.2 g/dL       Globulin      1.9 - 3.5 g/dL       A-G Ratio             Cholesterol,Tot      100 - 199 mg/dL    169   Triglycerides      0 - 149 mg/dL    76   HDL      >=40 mg/dL    61   LDL      <100 mg/dL    93   Testosterone,Total      300 - 1080 ng/dL  742     Sex Hormone Bind Globulin      11 - 80 nmol/L  54     Free Testosterone      47 - 244 pg/mL  111     Testosterone % Free      1.6 - 2.9 %  1.5 (L)     GFR If African American      >60 mL/min/1.73 m 2  >60      GFR If Non African American      >60 mL/min/1.73 m 2 >60      TSH      0.300 - 3.700 uIU/mL   2.590      Component      Latest Ref Rng 7/28/2017           9:04 AM   Sodium      135 - 145 mmol/L 142   Potassium      3.6 - 5.5 mmol/L 4.3   Chloride      96 - 112 mmol/L 108   Co2      20 - 33 mmol/L 25   Anion Gap      0.0 - 11.9 9.0   Glucose      65 - 99 mg/dL 84   Bun      8 - 22 mg/dL 13   Creatinine      0.50 - 1.40 mg/dL 0.80   Calcium      8.5 - 10.5 mg/dL 9.7   AST(SGOT)      12 - 45 U/L 16   ALT(SGPT)      2 - 50 U/L 15   Alkaline Phosphatase      30 - 99 U/L 42   Total Bilirubin      0.1 - 1.5 mg/dL 3.1 (H)   Albumin      3.2 - 4.9 g/dL 4.4   Total Protein      6.0 - 8.2 g/dL 7.4   Globulin      1.9 - 3.5 g/dL 3.0   A-G Ratio       1.5   Cholesterol,Tot      100 - 199 mg/dL    Triglycerides      0 - 149 mg/dL    HDL      >=40 mg/dL    LDL      <100 mg/dL    Testosterone,Total      300 - 1080 ng/dL    Sex Hormone Bind Globulin      11 - 80 nmol/L    Free Testosterone      47 - 244 pg/mL    Testosterone % Free      1.6 - 2.9 %    GFR If African American      >60 mL/min/1.73 m 2    GFR If Non African American      >60 mL/min/1.73 m 2    TSH      0.300 - 3.700 uIU/mL      Assessment/Plan:     1. Hyperbilirubinemia  Patient is asymptomatic and it appears that he has had elevated bilirubin in the past so I will have him do one more repeat test and if everything is normal he may have killed there is disease and we will continue to monitor.  - BILIRUBIN DIRECT; Future  - COMP METABOLIC PANEL; Future

## 2017-11-03 ENCOUNTER — APPOINTMENT (OUTPATIENT)
Dept: ADMISSIONS | Facility: MEDICAL CENTER | Age: 37
End: 2017-11-03
Attending: UROLOGY
Payer: COMMERCIAL

## 2017-11-06 ENCOUNTER — HOSPITAL ENCOUNTER (OUTPATIENT)
Facility: MEDICAL CENTER | Age: 37
End: 2017-11-06
Attending: UROLOGY | Admitting: UROLOGY
Payer: COMMERCIAL

## 2017-11-06 VITALS
WEIGHT: 154.1 LBS | OXYGEN SATURATION: 98 % | HEART RATE: 78 BPM | DIASTOLIC BLOOD PRESSURE: 66 MMHG | RESPIRATION RATE: 15 BRPM | TEMPERATURE: 97.1 F | BODY MASS INDEX: 22.06 KG/M2 | SYSTOLIC BLOOD PRESSURE: 97 MMHG | HEIGHT: 70 IN

## 2017-11-06 PROCEDURE — 160046 HCHG PACU - 1ST 60 MINS PHASE II: Performed by: UROLOGY

## 2017-11-06 PROCEDURE — 160009 HCHG ANES TIME/MIN: Performed by: UROLOGY

## 2017-11-06 PROCEDURE — 160002 HCHG RECOVERY MINUTES (STAT): Performed by: UROLOGY

## 2017-11-06 PROCEDURE — 160025 RECOVERY II MINUTES (STATS): Performed by: UROLOGY

## 2017-11-06 PROCEDURE — 700101 HCHG RX REV CODE 250

## 2017-11-06 PROCEDURE — 700111 HCHG RX REV CODE 636 W/ 250 OVERRIDE (IP)

## 2017-11-06 PROCEDURE — 160038 HCHG SURGERY MINUTES - EA ADDL 1 MIN LEVEL 2: Performed by: UROLOGY

## 2017-11-06 PROCEDURE — 160027 HCHG SURGERY MINUTES - 1ST 30 MINS LEVEL 2: Performed by: UROLOGY

## 2017-11-06 PROCEDURE — A6454 SELF-ADHER BAND W>=3" <5"/YD: HCPCS | Performed by: UROLOGY

## 2017-11-06 PROCEDURE — 160035 HCHG PACU - 1ST 60 MINS PHASE I: Performed by: UROLOGY

## 2017-11-06 PROCEDURE — 160048 HCHG OR STATISTICAL LEVEL 1-5: Performed by: UROLOGY

## 2017-11-06 PROCEDURE — 501838 HCHG SUTURE GENERAL: Performed by: UROLOGY

## 2017-11-06 PROCEDURE — A6222 GAUZE <=16 IN NO W/SAL W/O B: HCPCS | Performed by: UROLOGY

## 2017-11-06 RX ORDER — OXYCODONE HYDROCHLORIDE AND ACETAMINOPHEN 5; 325 MG/1; MG/1
1 TABLET ORAL EVERY 4 HOURS PRN
Qty: 10 TAB | Refills: 0 | Status: SHIPPED | OUTPATIENT
Start: 2017-11-06

## 2017-11-06 RX ORDER — OXYCODONE HYDROCHLORIDE AND ACETAMINOPHEN 5; 325 MG/1; MG/1
1 TABLET ORAL EVERY 4 HOURS PRN
Status: DISCONTINUED | OUTPATIENT
Start: 2017-11-06 | End: 2017-11-06 | Stop reason: HOSPADM

## 2017-11-06 RX ORDER — LIDOCAINE AND PRILOCAINE 25; 25 MG/G; MG/G
1 CREAM TOPICAL
Status: DISCONTINUED | OUTPATIENT
Start: 2017-11-06 | End: 2017-11-06 | Stop reason: HOSPADM

## 2017-11-06 RX ORDER — SODIUM CHLORIDE, SODIUM LACTATE, POTASSIUM CHLORIDE, CALCIUM CHLORIDE 600; 310; 30; 20 MG/100ML; MG/100ML; MG/100ML; MG/100ML
INJECTION, SOLUTION INTRAVENOUS CONTINUOUS
Status: DISCONTINUED | OUTPATIENT
Start: 2017-11-06 | End: 2017-11-06 | Stop reason: HOSPADM

## 2017-11-06 RX ORDER — LIDOCAINE HYDROCHLORIDE 10 MG/ML
0.5 INJECTION, SOLUTION INFILTRATION; PERINEURAL
Status: DISCONTINUED | OUTPATIENT
Start: 2017-11-06 | End: 2017-11-06 | Stop reason: HOSPADM

## 2017-11-06 RX ORDER — LIDOCAINE HYDROCHLORIDE 10 MG/ML
INJECTION, SOLUTION INFILTRATION; PERINEURAL
Status: COMPLETED
Start: 2017-11-06 | End: 2017-11-06

## 2017-11-06 RX ORDER — BUPIVACAINE HYDROCHLORIDE 2.5 MG/ML
INJECTION, SOLUTION EPIDURAL; INFILTRATION; INTRACAUDAL
Status: DISCONTINUED | OUTPATIENT
Start: 2017-11-06 | End: 2017-11-06 | Stop reason: HOSPADM

## 2017-11-06 RX ADMIN — LIDOCAINE HYDROCHLORIDE 0.5 ML: 10 INJECTION, SOLUTION INFILTRATION; PERINEURAL at 14:00

## 2017-11-06 RX ADMIN — SODIUM CHLORIDE, SODIUM LACTATE, POTASSIUM CHLORIDE, CALCIUM CHLORIDE: 600; 310; 30; 20 INJECTION, SOLUTION INTRAVENOUS at 14:00

## 2017-11-06 ASSESSMENT — PAIN SCALES - GENERAL
PAINLEVEL_OUTOF10: 0

## 2017-11-06 NOTE — OR SURGEON
Immediate Post OP Note    PreOp Diagnosis: phimosis    PostOp Diagnosis: same    Procedure(s):  CIRCUMCISION ADULT - Wound Class: Clean    Surgeon(s):  Arley Roberts M.D.    Anesthesiologist/Type of Anesthesia:  Anesthesiologist: Hal Perez M.D./General    Surgical Staff:  Circulator: Ladonna Patrick R.N.  Relief Scrub: Cale Fajardo  Scrub Person: Abel Ramos; Marely Zabala    Specimens:  none    Estimated Blood Loss: 20ml    Findings: phimosis    Complications: none      11/6/2017 3:50 PM Arley Roberts

## 2017-11-07 NOTE — DISCHARGE INSTRUCTIONS
ACTIVITY: Rest and take it easy for the first 24 hours.  A responsible adult is recommended to remain with you during that time.  It is normal to feel sleepy.  We encourage you to not do anything that requires balance, judgment or coordination.    MILD FLU-LIKE SYMPTOMS ARE NORMAL. YOU MAY EXPERIENCE GENERALIZED MUSCLE ACHES, THROAT IRRITATION, HEADACHE AND/OR SOME NAUSEA.    FOR 24 HOURS DO NOT:  Drive, operate machinery or run household appliances.  Drink beer or alcoholic beverages.   Make important decisions or sign legal documents.    SPECIAL INSTRUCTIONS:     Circumcision, Adult, Care After  Refer to this sheet in the next few weeks. These instructions provide you with information on caring for yourself after your procedure. Your health care provider may also give you more specific instructions. Your treatment has been planned according to current medical practices, but problems sometimes occur. Call your health care provider if you have any problems or questions after your procedure.  WHAT TO EXPECT AFTER THE PROCEDURE  After your procedure, it is typical to have the following sensations:  · Redness at the incision site.  · Soreness at the incision site.  · Slight swelling around the incision.  HOME CARE INSTRUCTIONS  · Take all medicines as directed by your health care provider.  · Any dressings should stay on for at least 24 hours. You may take the dressing off at night to let air circulate around the incision. Once a scab has formed over the incision, you no longer need to cover your incision with a dressing.  · Carefully remove the bandage if it gets dirty. Apply medicated cream to the incision. Carefully put a new bandage on if a scab has not formed.  · Do not have sex until your health care provider says it is okay.  · Do not get your incision site wet for 24 hours or as told by your health care provider.  · You may take a sponge bath. Clean around the incision site gently with mild soap and  water.  · You may take a shower after 24 hours or as told by your health care provider. Do not take a tub bath. After you shower, gently pat dry the incision site. Do not rub it.  · Avoid heavy lifting.  · Avoid contact sports, biking, or swimming until you have healed. This will usually be between 10 days and 14 days after the procedure.  SEEK MEDICAL CARE IF:  · You are experiencing pain that is not relieved by your medicine.  · You have swelling or redness that is unexpected.  · You develop a fever.  SEEK IMMEDIATE MEDICAL CARE IF:  · You cannot urinate.  · You have pain when you urinate.  · Your pain is not helped by medicines.  · There is redness, swelling, and pain (inflammation) spreading up the shaft of your penis, thighs, or lower abdomen.  · There is pus coming from your incision.  · You have bleeding that does not stop when you press on it.      DIET: To avoid nausea, slowly advance diet as tolerated, avoiding spicy or greasy foods for the first day.  Add more substantial food to your diet according to your physician's instructions.  Babies can be fed formula or breast milk as soon as they are hungry.  INCREASE FLUIDS AND FIBER TO AVOID CONSTIPATION.    SURGICAL DRESSING/BATHING: *may shower after dressing removed**    FOLLOW-UP APPOINTMENT:  A follow-up appointment should be arranged with your doctor in 1-2 weeks; call to schedule.    You should CALL YOUR PHYSICIAN if you develop:  Fever greater than 101 degrees F.  Pain not relieved by medication, or persistent nausea or vomiting.  Excessive bleeding (blood soaking through dressing) or unexpected drainage from the wound.  Extreme redness or swelling around the incision site, drainage of pus or foul smelling drainage.  Inability to urinate or empty your bladder within 8 hours.  Problems with breathing or chest pain.    You should call 911 if you develop problems with breathing or chest pain.  If you are unable to contact your doctor or surgical center,  you should go to the nearest emergency room or urgent care center.  Physician's telephone #: Dr. Roberts, [phone]    If any questions arise, call your doctor.  If your doctor is not available, please feel free to call the Surgical Center at [phone].  The Center is open Monday through Friday from 7AM to 7PM.  You can also call the HEALTH HOTLINE open 24 hours/day, 7 days/week and speak to a nurse at [phone], or toll free at [phone].    A registered nurse may call you a few days after your surgery to see how you are doing after your procedure.    MEDICATIONS: Resume taking daily medication.  Take prescribed pain medication with food.  If no medication is prescribed, you may take non-aspirin pain medication if needed.  PAIN MEDICATION CAN BE VERY CONSTIPATING.  Take a stool softener or laxative such as senokot, pericolace, or milk of magnesia if needed.    Prescription given for *percocet**.  Last pain medication given at *none*.    If your physician has prescribed pain medication that includes Acetaminophen (Tylenol), do not take additional Acetaminophen (Tylenol) while taking the prescribed medication.    Depression / Suicide Risk    As you are discharged from this [facility] facility, it is important to learn how to keep safe from harming yourself.    Recognize the warning signs:  · Abrupt changes in personality, positive or negative- including increase in energy   · Giving away possessions  · Change in eating patterns- significant weight changes-  positive or negative  · Change in sleeping patterns- unable to sleep or sleeping all the time   · Unwillingness or inability to communicate  · Depression  · Unusual sadness, discouragement and loneliness  · Talk of wanting to die  · Neglect of personal appearance   · Rebelliousness- reckless behavior  · Withdrawal from people/activities they love  · Confusion- inability to concentrate     If you or a loved one observes any of these behaviors or  has concerns about self-harm, here's what you can do:  · Talk about it- your feelings and reasons for harming yourself  · Remove any means that you might use to hurt yourself (examples: pills, rope, extension cords, firearm)  · Get professional help from the community (Mental Health, Substance Abuse, psychological counseling)  · Do not be alone:Call your Safe Contact- someone whom you trust who will be there for you.  · Call your local CRISIS HOTLINE 425-3307 or 810-707-1192  · Call your local Children's Mobile Crisis Response Team [place] (831) 008-3259 or www.[facility]  · Call the toll free National Suicide Prevention Hotlines   · National Suicide Prevention Lifeline 419-488-DZBN (1318)  · National Hope Line Network 800-SUICIDE (458-0279)

## 2017-11-07 NOTE — OP REPORT
DATE OF SERVICE:  11/06/2017    PREOPERATIVE DIAGNOSIS:  Phimosis.    POSTOPERATIVE DIAGNOSIS:  Phimosis.    PROCEDURE PERFORMED:  Circumcision.    SURGEON:  Arley Roberts MD    ANESTHESIOLOGIST:  Hal Perez MD    ANESTHESIA:  General.    INDICATIONS FOR PROCEDURE:  The patient is a 37-year-old male with history of   phimosis.  After discussing treatment options, he has elected for   circumcision.    DESCRIPTION OF PROCEDURE:  After obtaining informed consent, the patient was   brought to the operating room.  After the induction of general anesthesia, the   patient was placed in supine position and his genitalia prepped and draped in   sterile fashion.  Prior to this procedure, a deep dorsal and circumferential   penile block were performed using 0.25% Marcaine plain.  After incising the 2   marked lines on the foreskin, the foreskin was removed using Bovie cautery as   well as Metzenbaum scissors.  Bovie cautery was used to obtain hemostasis   after which the wound edges were reapproximated using 3-0 chromic in   interrupted fashion.  The area was then cleaned and dried.  Antibiotic   ointment, Vaseline gauze and mini Kerlix were placed around the incision.  The   patient was then awoken from anesthesia and taken to recovery room in stable   condition.       ____________________________________     Arley Roberts MD    RRG / NTS    DD:  11/06/2017 15:54:00  DT:  11/06/2017 16:23:57    D#:  [id number]  Job#:  [id number]

## 2017-11-07 NOTE — OR NURSING
Received from pacu at 1645.  Vss. Alert and oriented times three.  Dressing dry and intact.  Asking about weight lifting.  Encouraged him to wait at least 3 days and to get permission from his doctor.  Wanted to ambulate out with sister.  Walked to door with sister and balance steady.

## 2018-03-29 ENCOUNTER — PATIENT OUTREACH (OUTPATIENT)
Dept: HEALTH INFORMATION MANAGEMENT | Facility: OTHER | Age: 38
End: 2018-03-29

## 2018-03-29 NOTE — PROGRESS NOTES
Outcome: Left Message    Please transfer to Patient Outreach Team at [phone] when patient returns call.    WebIZ Checked & Epic Updated:  yes    HealthConnect Verified: yes    Attempt # 1

## 2019-04-08 ENCOUNTER — OFFICE VISIT (OUTPATIENT)
Dept: URGENT CARE | Facility: CLINIC | Age: 39
End: 2019-04-08
Payer: COMMERCIAL

## 2019-04-08 VITALS
SYSTOLIC BLOOD PRESSURE: 100 MMHG | HEIGHT: 70 IN | TEMPERATURE: 97.2 F | BODY MASS INDEX: 22.05 KG/M2 | OXYGEN SATURATION: 98 % | RESPIRATION RATE: 12 BRPM | DIASTOLIC BLOOD PRESSURE: 62 MMHG | HEART RATE: 76 BPM | WEIGHT: 154 LBS

## 2019-04-08 DIAGNOSIS — J02.9 SORE THROAT: ICD-10-CM

## 2019-04-08 DIAGNOSIS — J06.9 VIRAL URI WITH COUGH: ICD-10-CM

## 2019-04-08 LAB
INT CON NEG: NEGATIVE
INT CON POS: POSITIVE
S PYO AG THROAT QL: NEGATIVE

## 2019-04-08 PROCEDURE — 99214 OFFICE O/P EST MOD 30 MIN: CPT | Performed by: NURSE PRACTITIONER

## 2019-04-08 PROCEDURE — 87880 STREP A ASSAY W/OPTIC: CPT | Performed by: NURSE PRACTITIONER

## 2019-04-08 ASSESSMENT — ENCOUNTER SYMPTOMS
WHEEZING: 0
SWOLLEN GLANDS: 1
FEVER: 0
COUGH: 1
SORE THROAT: 1
HOARSE VOICE: 1
SHORTNESS OF BREATH: 0

## 2019-04-08 NOTE — LETTER
April 8, 2019         Patient: Octavio Burleson   YOB: 1980   Date of Visit: 4/8/2019           To Whom it May Concern:    Octavio Burleson was seen in my clinic on 4/8/2019. Please excuse him from work 4/8/19-4/9/19.        Sincerely,           JEFF Adamson.  Electronically Signed

## 2019-04-08 NOTE — PROGRESS NOTES
"Subjective:      Octavio Burleson is a 38 y.o. male who presents with Sinus Pain (Sneezing, cough, head ache, watery eyes and runny nose  x 5 days )            Pharyngitis    This is a new problem. Episode onset: pt reports new onset of sinus congestion, sinus pain, watery eyes, ST and cough that started 5 days ago. he denies any known fever. The problem has been unchanged. Neither side of throat is experiencing more pain than the other. Associated symptoms include congestion, coughing, a hoarse voice and swollen glands. Pertinent negatives include no shortness of breath. Treatments tried: dayquil and nyquil. The treatment provided no relief.       Review of Systems   Constitutional: Negative for fever.   HENT: Positive for congestion, hoarse voice and sore throat.    Respiratory: Positive for cough. Negative for shortness of breath and wheezing.    All other systems reviewed and are negative.    Past Medical History:   Diagnosis Date   • Heart burn       Past Surgical History:   Procedure Laterality Date   • CIRCUMCISION ADULT  11/6/2017    Procedure: CIRCUMCISION ADULT;  Surgeon: Arley Roberts M.D.;  [facility]: SURGERY [facility];  Service: Urology   • VASECTOMY  09/15/2017   • DENTAL EXTRACTION(S)  2005    wisdom teeth   • HUMERUS ORIF      Right Arm Age 18      Social History     Social History   • Marital status: Single     Spouse name: N/A   • Number of children: N/A   • Years of education: N/A     Occupational History   • Not on file.     Social History Main Topics   • Smoking status: Current Some Day Smoker     Years: 9.00     Types: Cigarettes, Cigars   • Smokeless tobacco: Never Used   • Alcohol use Yes      Comment: \"One per week\"   • Drug use: No   • Sexual activity: Not Currently     Other Topics Concern   • Not on file     Social History Narrative   • No narrative on file          Objective:     /62   Pulse 76   Temp 36.2 °C (97.2 °F)   Resp 12   Ht 1.778 m (5' 10\")   Wt 69.9 kg " (154 lb)   SpO2 98%   BMI 22.10 kg/m²      Physical Exam   Constitutional: He is oriented to person, place, and time. Vital signs are normal. He appears well-developed and well-nourished.   HENT:   Head: Normocephalic and atraumatic.   Right Ear: Tympanic membrane and external ear normal.   Left Ear: Tympanic membrane and external ear normal.   Nose: Mucosal edema and rhinorrhea present.   Mouth/Throat: Posterior oropharyngeal erythema present.   Eyes: Pupils are equal, round, and reactive to light. EOM are normal.   Neck: Normal range of motion.   Cardiovascular: Normal rate and regular rhythm.    Pulmonary/Chest: Effort normal and breath sounds normal.   Musculoskeletal: Normal range of motion.   Lymphadenopathy:        Head (right side): Submandibular adenopathy present.        Head (left side): Submandibular adenopathy present.   Neurological: He is alert and oriented to person, place, and time.   Skin: Skin is warm and dry. Capillary refill takes less than 2 seconds.   Psychiatric: He has a normal mood and affect. His speech is normal and behavior is normal. Thought content normal.   Vitals reviewed.              Assessment/Plan:     1. Sore throat  - POCT Rapid Strep A NEGATIVE    2. Viral URI with cough    Discussed with patient symptoms are viral in nature, there is low concern for any respiratory infection currently. Antibiotics are not advised at this time.  Warm salt water gargles  Alternate tylenol and ibuprofen for pain  Soft foods and cool liquids  Throat lozenges as directed  Encouraged rest and supportive care  Work note provided  Supportive care, differential diagnoses, and indications for immediate follow-up discussed with patient.    Pathogenesis of diagnosis discussed including typical length and natural progression.      Instructed to return to  or nearest emergency department if symptoms fail to improve, for any change in condition, further concerns, or new concerning symptoms.  Patient  states understanding of the plan of care and discharge instructions.

## 2022-01-13 ENCOUNTER — OFFICE VISIT (OUTPATIENT)
Dept: URGENT CARE | Facility: CLINIC | Age: 42
End: 2022-01-13
Payer: COMMERCIAL

## 2022-01-13 ENCOUNTER — HOSPITAL ENCOUNTER (OUTPATIENT)
Facility: MEDICAL CENTER | Age: 42
End: 2022-01-13
Attending: PHYSICIAN ASSISTANT
Payer: COMMERCIAL

## 2022-01-13 VITALS
HEART RATE: 80 BPM | HEIGHT: 70 IN | BODY MASS INDEX: 22.9 KG/M2 | SYSTOLIC BLOOD PRESSURE: 122 MMHG | DIASTOLIC BLOOD PRESSURE: 80 MMHG | WEIGHT: 160 LBS | TEMPERATURE: 98.9 F | OXYGEN SATURATION: 96 % | RESPIRATION RATE: 20 BRPM

## 2022-01-13 DIAGNOSIS — J06.9 VIRAL URI WITH COUGH: ICD-10-CM

## 2022-01-13 PROCEDURE — U0003 INFECTIOUS AGENT DETECTION BY NUCLEIC ACID (DNA OR RNA); SEVERE ACUTE RESPIRATORY SYNDROME CORONAVIRUS 2 (SARS-COV-2) (CORONAVIRUS DISEASE [COVID-19]), AMPLIFIED PROBE TECHNIQUE, MAKING USE OF HIGH THROUGHPUT TECHNOLOGIES AS DESCRIBED BY CMS-2020-01-R: HCPCS

## 2022-01-13 PROCEDURE — 99213 OFFICE O/P EST LOW 20 MIN: CPT | Performed by: PHYSICIAN ASSISTANT

## 2022-01-13 PROCEDURE — U0005 INFEC AGEN DETEC AMPLI PROBE: HCPCS

## 2022-01-13 ASSESSMENT — ENCOUNTER SYMPTOMS
HEADACHES: 1
CHILLS: 1
SHORTNESS OF BREATH: 0
RHINORRHEA: 1
COUGH: 1
MYALGIAS: 1
SORE THROAT: 1
FEVER: 0

## 2022-01-13 NOTE — LETTER
January 13, 2022    To Whom It May Concern:         This is confirmation that Octavio Tarik Burleson attended his scheduled appointment with Nadeem Beltran P.A.-C. on 1/13/22. His testing is pending. He was instructed to quarantine IAW with CDC guidelines for at least the next 48-72hrs.         If you have any questions please do not hesitate to call me at the phone number listed below.    Sincerely,          Nadeem Beltran P.A.-C.  [phone]

## 2022-01-14 DIAGNOSIS — J06.9 VIRAL URI WITH COUGH: ICD-10-CM

## 2022-01-14 LAB — COVID ORDER STATUS COVID19: NORMAL

## 2022-01-14 NOTE — PROGRESS NOTES
Subjective:   Octavio Burleson is a 41 y.o. male who presents for Cough (cough, runny nose, BA, HA, sore throat x 2 days)        Cough  This is a new problem. The current episode started in the past 7 days (3 days). The problem has been gradually improving. The cough is non-productive. Associated symptoms include chills, headaches, myalgias, nasal congestion, rhinorrhea and a sore throat. Pertinent negatives include no fever or shortness of breath. He has tried rest (nyquil and dayquil, sudafed) for the symptoms. The treatment provided moderate relief.     Review of Systems   Constitutional: Positive for chills. Negative for fever.   HENT: Positive for rhinorrhea and sore throat.    Respiratory: Positive for cough. Negative for shortness of breath.    Musculoskeletal: Positive for myalgias.   Neurological: Positive for headaches.       PMH:  has a past medical history of Heart burn.  MEDS:   Current Outpatient Medications:   •  Pseudoeph-Doxylamine-DM-APAP (NYQUIL PO), Take  by mouth., Disp: , Rfl:   •  oxycodone-acetaminophen (PERCOCET) 5-325 MG Tab, Take 1 Tab by mouth every four hours as needed for Moderate Pain. (Patient not taking: Reported on 4/8/2019), Disp: 10 Tab, Rfl: 0  •  Multiple Vitamins-Minerals (MULTIVITAMIN ADULTS PO), Take 1 Tab by mouth every day. (Patient not taking: Reported on 1/13/2022), Disp: , Rfl:   ALLERGIES: No Known Allergies  SURGHX:   Past Surgical History:   Procedure Laterality Date   • CIRCUMCISION ADULT  11/6/2017    Procedure: CIRCUMCISION ADULT;  Surgeon: Arley Roberts M.D.;  Location: SURGERY [facility];  Service: Urology   • VASECTOMY  09/15/2017   • DENTAL EXTRACTION(S)  2005    wisdom teeth   • HUMERUS ORIF      Right Arm Age 18     SOCHX:  reports that he has been smoking cigarettes and cigars. He has smoked for the past 9.00 years. He has never used smokeless tobacco. He reports current alcohol use. He reports that he does not use drugs.  FH: Family history was  "reviewed, no pertinent findings to report   Objective:   /80 (BP Location: Left arm, Patient Position: Sitting, BP Cuff Size: Adult long)   Pulse 80   Temp 37.2 °C (98.9 °F) (Temporal)   Resp 20   Ht 1.778 m (5' 10\")   Wt 72.6 kg (160 lb)   SpO2 96%   BMI 22.96 kg/m²   Physical Exam  Vitals reviewed.   Constitutional:       General: He is not in acute distress.     Appearance: Normal appearance. He is well-developed. He is not toxic-appearing.   HENT:      Head: Normocephalic and atraumatic.      Right Ear: External ear normal.      Left Ear: External ear normal.      Nose: Congestion and rhinorrhea present. Rhinorrhea is clear.      Mouth/Throat:      Lips: Pink.      Mouth: Mucous membranes are moist.      Pharynx: Oropharynx is clear. Uvula midline. No posterior oropharyngeal erythema.   Cardiovascular:      Rate and Rhythm: Normal rate and regular rhythm.      Heart sounds: Normal heart sounds, S1 normal and S2 normal.   Pulmonary:      Effort: Pulmonary effort is normal. No respiratory distress.      Breath sounds: Normal breath sounds. No stridor. No decreased breath sounds, wheezing, rhonchi or rales.   Skin:     General: Skin is dry.   Neurological:      Comments: Alert and oriented.    Psychiatric:         Speech: Speech normal.         Behavior: Behavior normal.           Assessment/Plan:   1. Viral URI with cough  - COVID/SARS CoV-2 PCR; Future    Discussed with patient signs and symptoms most likely are due to a viral etiology.     Test for COVID-19. We will call/message back for results and appropriate further instructions. Instructed to sign up for [employer] if they have not already. Result will be released to [employer] application.   Symptomatic and supportive care:   Plenty of oral hydration and rest   Over the counter cough suppressant as directed.  Tylenol or ibuprofen for pain and fever as directed.   Saline nasal spray and Flonase as a decongestant.   Infection control measures at home. " Stay away from people, Hand washing, covering sneeze/cough, disinfect surfaces.   Remain home from work, school, and other populated environments.     Differential diagnosis, natural history, supportive care, and indications for immediate follow-up discussed.

## 2022-01-15 LAB
SARS-COV-2 RNA RESP QL NAA+PROBE: DETECTED
SPECIMEN SOURCE: ABNORMAL

## (undated) DEVICE — TUBING CLEARLINK DUO-VENT - C-FLO (48EA/CA)

## (undated) DEVICE — MASK ANESTHESIA ADULT  - (100/CA)

## (undated) DEVICE — GLOVE BIOGEL SZ 6.5 SURGICAL PF LTX (50PR/BX 4BX/CA)

## (undated) DEVICE — ELECTRODE 850 FOAM ADHESIVE - HYDROGEL RADIOTRNSPRNT (50/PK)

## (undated) DEVICE — SUCTION INSTRUMENT YANKAUER BULBOUS TIP W/O VENT (50EA/CA)

## (undated) DEVICE — SUTURE 3-0 CHROMIC GUT SH 27 (36PK/BX)"

## (undated) DEVICE — WRAP CO-FLEX 4IN X 5YD STERIL - SELF-ADHERENT (18/CA)

## (undated) DEVICE — LACTATED RINGERS INJ 1000 ML - (14EA/CA 60CA/PF)

## (undated) DEVICE — SENSOR SPO2 NEO LNCS ADHESIVE (20/BX) SEE USER NOTES

## (undated) DEVICE — KIT ANESTHESIA W/CIRCUIT & 3/LT BAG W/FILTER (20EA/CA)

## (undated) DEVICE — NEPTUNE 4 PORT MANIFOLD - (20/PK)

## (undated) DEVICE — SLEEVE, VASO, THIGH, MED

## (undated) DEVICE — GOWN WARMING STANDARD FLEX - (30/CA)

## (undated) DEVICE — SODIUM CHL IRRIGATION 0.9% 1000ML (12EA/CA)

## (undated) DEVICE — GLOVE BIOGEL SZ 7.5 SURGICAL PF LTX - (50PR/BX 4BX/CA)

## (undated) DEVICE — HEAD HOLDER JUNIOR/ADULT

## (undated) DEVICE — ELECTRODE DUAL RETURN W/ CORD - (50/PK)

## (undated) DEVICE — SYRINGE 10 ML CONTROL LL (25EA/BX 4BX/CA)

## (undated) DEVICE — SET LEADWIRE 5 LEAD BEDSIDE DISPOSABLE ECG (1SET OF 5/EA)

## (undated) DEVICE — PACK MINOR BASIN - (2EA/CA)

## (undated) DEVICE — SUTURE GENERAL

## (undated) DEVICE — BLADE SURGICAL #15 - (50/BX 3BX/CA)

## (undated) DEVICE — DRESSING XEROFORM 1X8 - (50/BX 4BX/CA)

## (undated) DEVICE — TRAY SRGPRP PVP IOD WT PRP - (20/CA)

## (undated) DEVICE — SET EXTENSION WITH 2 PORTS (48EA/CA) ***PART #2C8610 IS A SUBSTITUTE*****

## (undated) DEVICE — DRAPE LAPAROTOMY T SHEET - (12EA/CA)

## (undated) DEVICE — CANISTER SUCTION 3000ML MECHANICAL FILTER AUTO SHUTOFF MEDI-VAC NONSTERILE LF DISP  (40EA/CA)

## (undated) DEVICE — BANDAGE STERILE 2 IN X 75 IN (12EA/BX 8BX/CA)

## (undated) DEVICE — KIT ROOM DECONTAMINATION

## (undated) DEVICE — PROTECTOR ULNA NERVE - (36PR/CA)

## (undated) DEVICE — NEEDLE NON SAFETY 25 GA X 1 1/2 IN HYPO (100EA/BX)